# Patient Record
Sex: FEMALE | URBAN - METROPOLITAN AREA
[De-identification: names, ages, dates, MRNs, and addresses within clinical notes are randomized per-mention and may not be internally consistent; named-entity substitution may affect disease eponyms.]

---

## 2022-02-28 ENCOUNTER — OFFICE VISIT (OUTPATIENT)
Dept: URBAN - METROPOLITAN AREA CLINIC 6 | Facility: CLINIC | Age: 57
End: 2022-02-28
Payer: COMMERCIAL

## 2022-02-28 DIAGNOSIS — E11.9 DIABETES MELLITUS TYPE 2 WITHOUT MENTION OF COMPLICATION: Primary | ICD-10-CM

## 2022-02-28 DIAGNOSIS — H25.13 AGE-RELATED NUCLEAR CATARACT, BILATERAL: ICD-10-CM

## 2022-02-28 DIAGNOSIS — H04.123 TEAR FILM INSUFFICIENCY OF BILATERAL LACRIMAL GLANDS: ICD-10-CM

## 2022-02-28 DIAGNOSIS — H40.013 OPEN ANGLE WITH BORDERLINE FINDINGS, LOW RISK, BILATERAL: ICD-10-CM

## 2022-02-28 PROCEDURE — 99204 OFFICE O/P NEW MOD 45 MIN: CPT | Performed by: OPTOMETRIST

## 2022-02-28 PROCEDURE — 92250 FUNDUS PHOTOGRAPHY W/I&R: CPT | Performed by: OPTOMETRIST

## 2022-02-28 ASSESSMENT — INTRAOCULAR PRESSURE
OS: 17
OD: 15

## 2022-02-28 NOTE — IMPRESSION/PLAN
Impression: Diabetes mellitus Type 2 without mention of complication: P66.2. Plan: DM Type 2 Without complications- Discussed the pathophysiology of diabetes and its effect on the eye. Stressed the importance of strong glucose control. Advised of importance of scheduled dilated examinations, and to contact us immediately for any problems or concerns.

## 2022-02-28 NOTE — IMPRESSION/PLAN
Impression: Open angle with borderline findings, low risk, bilateral: H40.013. Plan: Optic disc OD>OS- The pathophysiology of glaucoma and the difference between having glaucoma and being a glaucoma suspect were discussed with the patient. A baseline Velazquez 24-2 visual field, nerve fiber layer analysis by OCT, and pachymetry were ordered. Baseline retinal photos were taken today and shown to the patient. All of the patients questions were answered and they stated they understand their finding

## 2022-05-06 ENCOUNTER — OFFICE VISIT (OUTPATIENT)
Dept: URBAN - METROPOLITAN AREA CLINIC 6 | Facility: CLINIC | Age: 57
End: 2022-05-06
Payer: COMMERCIAL

## 2022-05-06 DIAGNOSIS — H40.013 OPEN ANGLE WITH BORDERLINE FINDINGS, LOW RISK, BILATERAL: Primary | ICD-10-CM

## 2022-05-06 PROCEDURE — 76514 ECHO EXAM OF EYE THICKNESS: CPT | Performed by: OPTOMETRIST

## 2022-05-06 PROCEDURE — 92083 EXTENDED VISUAL FIELD XM: CPT | Performed by: OPTOMETRIST

## 2022-05-06 PROCEDURE — 92133 CPTRZD OPH DX IMG PST SGM ON: CPT | Performed by: OPTOMETRIST

## 2022-05-06 PROCEDURE — 92012 INTRM OPH EXAM EST PATIENT: CPT | Performed by: OPTOMETRIST

## 2022-05-06 ASSESSMENT — INTRAOCULAR PRESSURE
OS: 20
OD: 18

## 2022-05-06 NOTE — IMPRESSION/PLAN
Impression: Open angle with borderline findings, low risk, bilateral: H40.013. Plan: Low risk glaucoma suspect OU - RNFL OCT scan shows average thicknesses of  88/92 and no focal thin quadrants in either eye. Humphery Visual Field 24-2 unreliable both eyes and was without glaucomatous patterns in either eye. Intraocular pressure is normal and pachymetry revealed normal corneal thicknesses. Plan for annual dilated examination with fundus photos and annual undilated glaucoma testing with HVF testing and RNFL scans. The results of testing was reviewed with the patient. The patients questions were answered and stated they understood the findings and need for regular monitoring.